# Patient Record
Sex: MALE | Race: BLACK OR AFRICAN AMERICAN | NOT HISPANIC OR LATINO | Employment: FULL TIME | URBAN - METROPOLITAN AREA
[De-identification: names, ages, dates, MRNs, and addresses within clinical notes are randomized per-mention and may not be internally consistent; named-entity substitution may affect disease eponyms.]

---

## 2021-02-10 ENCOUNTER — LAB VISIT (OUTPATIENT)
Dept: PRIMARY CARE CLINIC | Facility: OTHER | Age: 26
End: 2021-02-10
Attending: INTERNAL MEDICINE
Payer: OTHER GOVERNMENT

## 2021-02-10 DIAGNOSIS — Z20.822 ENCOUNTER FOR LABORATORY TESTING FOR COVID-19 VIRUS: ICD-10-CM

## 2021-02-10 DIAGNOSIS — R43.9 PROBLEMS WITH SMELL AND TASTE: ICD-10-CM

## 2021-02-10 PROCEDURE — U0003 INFECTIOUS AGENT DETECTION BY NUCLEIC ACID (DNA OR RNA); SEVERE ACUTE RESPIRATORY SYNDROME CORONAVIRUS 2 (SARS-COV-2) (CORONAVIRUS DISEASE [COVID-19]), AMPLIFIED PROBE TECHNIQUE, MAKING USE OF HIGH THROUGHPUT TECHNOLOGIES AS DESCRIBED BY CMS-2020-01-R: HCPCS

## 2021-02-11 LAB — SARS-COV-2 RNA RESP QL NAA+PROBE: NOT DETECTED

## 2021-04-22 ENCOUNTER — OFFICE VISIT (OUTPATIENT)
Dept: URGENT CARE | Facility: CLINIC | Age: 26
End: 2021-04-22

## 2021-04-22 VITALS
RESPIRATION RATE: 16 BRPM | HEIGHT: 66 IN | OXYGEN SATURATION: 99 % | DIASTOLIC BLOOD PRESSURE: 71 MMHG | SYSTOLIC BLOOD PRESSURE: 110 MMHG | BODY MASS INDEX: 27.32 KG/M2 | WEIGHT: 170 LBS | TEMPERATURE: 98 F | HEART RATE: 57 BPM

## 2021-04-22 DIAGNOSIS — Z23 NEED FOR PROPHYLACTIC VACCINATION WITH TETANUS-DIPHTHERIA (TD): ICD-10-CM

## 2021-04-22 DIAGNOSIS — S91.332A PUNCTURE WOUND OF LEFT FOOT, INITIAL ENCOUNTER: Primary | ICD-10-CM

## 2021-04-22 PROCEDURE — 90471 TD VACCINE GREATER THAN OR EQUAL TO 7YO WITH PRESERVATIVE IM: ICD-10-PCS | Mod: S$GLB,,, | Performed by: NURSE PRACTITIONER

## 2021-04-22 PROCEDURE — 99204 OFFICE O/P NEW MOD 45 MIN: CPT | Mod: 25,S$GLB,, | Performed by: NURSE PRACTITIONER

## 2021-04-22 PROCEDURE — 90471 IMMUNIZATION ADMIN: CPT | Mod: S$GLB,,, | Performed by: NURSE PRACTITIONER

## 2021-04-22 PROCEDURE — 90714 TD VACCINE GREATER THAN OR EQUAL TO 7YO WITH PRESERVATIVE IM: ICD-10-PCS | Mod: S$GLB,,, | Performed by: NURSE PRACTITIONER

## 2021-04-22 PROCEDURE — 90714 TD VACC NO PRESV 7 YRS+ IM: CPT | Mod: S$GLB,,, | Performed by: NURSE PRACTITIONER

## 2021-04-22 PROCEDURE — 99204 PR OFFICE/OUTPT VISIT, NEW, LEVL IV, 45-59 MIN: ICD-10-PCS | Mod: 25,S$GLB,, | Performed by: NURSE PRACTITIONER

## 2021-04-22 RX ORDER — CIPROFLOXACIN 500 MG/1
500 TABLET ORAL EVERY 12 HOURS
Qty: 14 TABLET | Refills: 0 | Status: SHIPPED | OUTPATIENT
Start: 2021-04-22 | End: 2021-04-29

## 2023-07-06 ENCOUNTER — OFFICE VISIT (OUTPATIENT)
Dept: URGENT CARE | Facility: CLINIC | Age: 28
End: 2023-07-06

## 2023-07-06 VITALS
OXYGEN SATURATION: 97 % | DIASTOLIC BLOOD PRESSURE: 63 MMHG | HEART RATE: 65 BPM | TEMPERATURE: 97 F | WEIGHT: 170 LBS | BODY MASS INDEX: 27.44 KG/M2 | RESPIRATION RATE: 16 BRPM | SYSTOLIC BLOOD PRESSURE: 102 MMHG
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
## 2023-07-06 PROCEDURE — 99204 PR OFFICE/OUTPT VISIT, NEW, LEVL IV, 45-59 MIN: ICD-10-PCS | Mod: TIER,S$GLB,, | Performed by: NURSE PRACTITIONER

## 2023-07-06 PROCEDURE — 99204 OFFICE O/P NEW MOD 45 MIN: CPT | Mod: TIER,S$GLB,, | Performed by: NURSE PRACTITIONER

## 2023-07-06 NOTE — PROGRESS NOTES
CHIEF COMPLAINT  Chief Complaint   Patient presents with    Body Fluid Exposure     Pt was stuck by a needle        HPI  Kevin Meredith a 27 y.o. male who presents c/o needle stick. 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 Pt reports yesterday while unloading bags at the dump he felt a stick to his left middle finger and noticed blood when he removed his gloves. Pt opened the bag of trash to find numerous insulin needles. Pt reports he was wearing heavy duty cloth gloves at the time of injury. Pt reports he was able to bleed the site for a minute at the time of injury. Pt denies any symptoms at this time including, but not limited to, numbness, tingling, decreased sensation, decreased ROM, abdominal pain, n/v/d, fever, rash, pain, cough, sore throat , chest pain or SOB.      CURRENT MEDICATIONS  No current outpatient medications on file prior to visit.     No current facility-administered medications on file prior to visit.       ALLERGIES  Review of patient's allergies indicates:  No Known Allergies    Immunization History   Administered Date(s) Administered    Td (ADULT) 04/22/2021       PAST MEDICAL HISTORY  History reviewed. No pertinent past medical history.    SURGICAL HISTORY  History reviewed. No pertinent surgical history.    SOCIAL HISTORY  Social History     Socioeconomic History    Marital status: Single   Tobacco Use    Smoking status: Every Day       FAMILY HISTORY  History reviewed. No pertinent family history.    REVIEW OF SYSTEMS  Constitutional: No fever, chills, or weakness.  Respiratory: No cough, wheezing or shortness of breath  Cardiovascular: No chest pain, palpitations or edema  Musculoskeletal: No pain, full range of motion. Good sensation  Skin: Puncture wound to left middle finger  Neurologic: No focal weakness or sensory changes.  All systems otherwise negative except as noted in the Review of Systems  and History of Present Illness      PHYSICAL EXAM  Reviewed Triage Note  VITAL SIGNS: 102/63  Constitutional: Well developed, well nourished, Alert and oriented x3, No acute distress, non-toxic appearance.  Respiratory: Normal breath sounds, no respiratory distress, no wheezing, no rhonchi, no rales  Cardiovascular: HR 65, normal rhythm, no murmurs, no rubs, no gallops.  Musculoskeletal: No edema, no tenderness, no cyanosis, no clubbing. Good range of motion in all major joints. No major deformities noted.   Integument: Warm, Dry, No erythema, no rash, small puncture wound to distal aspect of 3rd digit to left hand, no bleeding  Neurologic: Normal motor function, normal sensory function. No focal deficits noted. Intact distal pulses  Psychiatric: Affect normal, judgment normal, mood normal      LABS  Pertinent labs reviewed. (see chart for details)  HIV collected  RPR collected  Hep Panel collected        ED COURSE & MEDICAL DECISION MAKING  Physical exam findings discussed with patient. No acute emergent medical condition identified at this time to warrant further testing. Will dispo home with instructions to follow up with PCP tomorrow. Pt agrees with plan of care.     DISPOSITION  Patient discharged in stable condition   CLINICAL IMPRESSION:  The encounter diagnosis was Needle stick, hypodermic, accidental, initial encounter.    Patient advised to follow-up with your PCP within 3 days for BP re-check if Blood Pressure was >120/80 without history of hypertension.